# Patient Record
Sex: MALE | Race: WHITE
[De-identification: names, ages, dates, MRNs, and addresses within clinical notes are randomized per-mention and may not be internally consistent; named-entity substitution may affect disease eponyms.]

---

## 2017-05-23 LAB
ANION GAP SERPL CALC-SCNC: 12 MMOL/L (ref 5–19)
BUN SERPL-MCNC: 14 MG/DL (ref 7–20)
CALCIUM: 9 MG/DL (ref 8.4–10.2)
CHLORIDE SERPL-SCNC: 104 MMOL/L (ref 98–107)
CO2 SERPL-SCNC: 24 MMOL/L (ref 22–30)
CREAT SERPL-MCNC: 0.59 MG/DL (ref 0.52–1.25)
ERYTHROCYTE [DISTWIDTH] IN BLOOD BY AUTOMATED COUNT: 16 % (ref 11.5–14)
GLUCOSE SERPL-MCNC: 114 MG/DL (ref 75–110)
HCT VFR BLD CALC: 36.7 % (ref 37.9–51)
HGB BLD-MCNC: 12.1 G/DL (ref 13.5–17)
HGB HCT DIFFERENCE: -0.4
MCH RBC QN AUTO: 25.9 PG (ref 27–33.4)
MCHC RBC AUTO-ENTMCNC: 32.9 G/DL (ref 32–36)
MCV RBC AUTO: 79 FL (ref 80–97)
POTASSIUM SERPL-SCNC: 5.1 MMOL/L (ref 3.6–5)
RBC # BLD AUTO: 4.67 10^6/UL (ref 4.35–5.55)
SODIUM SERPL-SCNC: 140 MMOL/L (ref 137–145)
WBC # BLD AUTO: 4 10^3/UL (ref 4–10.5)

## 2017-05-30 ENCOUNTER — HOSPITAL ENCOUNTER (OUTPATIENT)
Dept: HOSPITAL 62 - OROUT | Age: 67
LOS: 3 days | Discharge: HOME | End: 2017-06-02
Attending: SURGERY
Payer: MEDICARE

## 2017-05-30 DIAGNOSIS — K70.31: ICD-10-CM

## 2017-05-30 DIAGNOSIS — Z80.0: ICD-10-CM

## 2017-05-30 DIAGNOSIS — R06.00: ICD-10-CM

## 2017-05-30 DIAGNOSIS — D61.818: ICD-10-CM

## 2017-05-30 DIAGNOSIS — I85.00: ICD-10-CM

## 2017-05-30 DIAGNOSIS — K42.9: ICD-10-CM

## 2017-05-30 DIAGNOSIS — D50.0: ICD-10-CM

## 2017-05-30 DIAGNOSIS — Z87.891: ICD-10-CM

## 2017-05-30 DIAGNOSIS — Z79.899: ICD-10-CM

## 2017-05-30 DIAGNOSIS — K76.6: ICD-10-CM

## 2017-05-30 DIAGNOSIS — C18.9: ICD-10-CM

## 2017-05-30 DIAGNOSIS — K31.7: Primary | ICD-10-CM

## 2017-05-30 DIAGNOSIS — R16.1: ICD-10-CM

## 2017-05-30 DIAGNOSIS — K64.8: ICD-10-CM

## 2017-05-30 DIAGNOSIS — N13.30: ICD-10-CM

## 2017-05-30 DIAGNOSIS — R74.0: ICD-10-CM

## 2017-05-30 DIAGNOSIS — K43.9: ICD-10-CM

## 2017-05-30 DIAGNOSIS — K21.9: ICD-10-CM

## 2017-05-30 LAB
ALBUMIN SERPL-MCNC: 3.2 G/DL (ref 3.5–5)
ALP SERPL-CCNC: 82 U/L (ref 38–126)
ALT SERPL-CCNC: 151 U/L (ref 21–72)
ANION GAP SERPL CALC-SCNC: 6 MMOL/L (ref 5–19)
ANION GAP SERPL CALC-SCNC: 9 MMOL/L (ref 5–19)
APTT BLD: 30.9 SEC (ref 23.5–35.8)
AST SERPL-CCNC: 163 U/L (ref 17–59)
BASOPHILS # BLD AUTO: 0 10^3/UL (ref 0–0.2)
BASOPHILS NFR BLD AUTO: 0.7 % (ref 0–2)
BILIRUB DIRECT SERPL-MCNC: 0.3 MG/DL (ref 0–0.4)
BILIRUB SERPL-MCNC: 0.9 MG/DL (ref 0.2–1.3)
BUN SERPL-MCNC: 13 MG/DL (ref 7–20)
BUN SERPL-MCNC: 13 MG/DL (ref 7–20)
CALCIUM: 8.2 MG/DL (ref 8.4–10.2)
CALCIUM: 8.5 MG/DL (ref 8.4–10.2)
CHLORIDE SERPL-SCNC: 106 MMOL/L (ref 98–107)
CHLORIDE SERPL-SCNC: 107 MMOL/L (ref 98–107)
CO2 SERPL-SCNC: 22 MMOL/L (ref 22–30)
CO2 SERPL-SCNC: 24 MMOL/L (ref 22–30)
CREAT SERPL-MCNC: 0.59 MG/DL (ref 0.52–1.25)
CREAT SERPL-MCNC: 0.6 MG/DL (ref 0.52–1.25)
EOSINOPHIL # BLD AUTO: 0.1 10^3/UL (ref 0–0.6)
EOSINOPHIL NFR BLD AUTO: 1.7 % (ref 0–6)
ERYTHROCYTE [DISTWIDTH] IN BLOOD BY AUTOMATED COUNT: 16.3 % (ref 11.5–14)
ERYTHROCYTE [DISTWIDTH] IN BLOOD BY AUTOMATED COUNT: 16.5 % (ref 11.5–14)
GLUCOSE SERPL-MCNC: 93 MG/DL (ref 75–110)
GLUCOSE SERPL-MCNC: 94 MG/DL (ref 75–110)
HCT VFR BLD CALC: 24.7 % (ref 37.9–51)
HCT VFR BLD CALC: 26.8 % (ref 37.9–51)
HGB BLD-MCNC: 8.2 G/DL (ref 13.5–17)
HGB BLD-MCNC: 8.8 G/DL (ref 13.5–17)
HGB HCT DIFFERENCE: -0.1
HGB HCT DIFFERENCE: -0.4
LYMPHOCYTES # BLD AUTO: 1.3 10^3/UL (ref 0.5–4.7)
LYMPHOCYTES NFR BLD AUTO: 25.7 % (ref 13–45)
MAGNESIUM SERPL-MCNC: 1.8 MG/DL (ref 1.6–2.3)
MCH RBC QN AUTO: 26.3 PG (ref 27–33.4)
MCH RBC QN AUTO: 26.7 PG (ref 27–33.4)
MCHC RBC AUTO-ENTMCNC: 32.8 G/DL (ref 32–36)
MCHC RBC AUTO-ENTMCNC: 33.4 G/DL (ref 32–36)
MCV RBC AUTO: 80 FL (ref 80–97)
MCV RBC AUTO: 80 FL (ref 80–97)
MONOCYTES # BLD AUTO: 0.5 10^3/UL (ref 0.1–1.4)
MONOCYTES NFR BLD AUTO: 10.8 % (ref 3–13)
NEUTROPHILS # BLD AUTO: 3.1 10^3/UL (ref 1.7–8.2)
NEUTS SEG NFR BLD AUTO: 61.1 % (ref 42–78)
PHOSPHATE SERPL-MCNC: 3.3 MG/DL (ref 2.5–4.5)
POTASSIUM SERPL-SCNC: 3.9 MMOL/L (ref 3.6–5)
POTASSIUM SERPL-SCNC: 4.1 MMOL/L (ref 3.6–5)
PROT SERPL-MCNC: 6.5 G/DL (ref 6.3–8.2)
PROTHROMBIN TIME: 14.4 SEC (ref 11.4–15.4)
RBC # BLD AUTO: 3.08 10^6/UL (ref 4.35–5.55)
RBC # BLD AUTO: 3.35 10^6/UL (ref 4.35–5.55)
SODIUM SERPL-SCNC: 137 MMOL/L (ref 137–145)
SODIUM SERPL-SCNC: 137.1 MMOL/L (ref 137–145)
WBC # BLD AUTO: 2.8 10^3/UL (ref 4–10.5)
WBC # BLD AUTO: 5 10^3/UL (ref 4–10.5)

## 2017-05-30 PROCEDURE — 87075 CULTR BACTERIA EXCEPT BLOOD: CPT

## 2017-05-30 PROCEDURE — 83615 LACTATE (LD) (LDH) ENZYME: CPT

## 2017-05-30 PROCEDURE — 83880 ASSAY OF NATRIURETIC PEPTIDE: CPT

## 2017-05-30 PROCEDURE — 85384 FIBRINOGEN ACTIVITY: CPT

## 2017-05-30 PROCEDURE — 93010 ELECTROCARDIOGRAM REPORT: CPT

## 2017-05-30 PROCEDURE — 82728 ASSAY OF FERRITIN: CPT

## 2017-05-30 PROCEDURE — 88342 IMHCHEM/IMCYTCHM 1ST ANTB: CPT

## 2017-05-30 PROCEDURE — 83540 ASSAY OF IRON: CPT

## 2017-05-30 PROCEDURE — 74177 CT ABD & PELVIS W/CONTRAST: CPT

## 2017-05-30 PROCEDURE — 85610 PROTHROMBIN TIME: CPT

## 2017-05-30 PROCEDURE — 80048 BASIC METABOLIC PNL TOTAL CA: CPT

## 2017-05-30 PROCEDURE — 0DB68ZX EXCISION OF STOMACH, VIA NATURAL OR ARTIFICIAL OPENING ENDOSCOPIC, DIAGNOSTIC: ICD-10-PCS | Performed by: SURGERY

## 2017-05-30 PROCEDURE — 82105 ALPHA-FETOPROTEIN SERUM: CPT

## 2017-05-30 PROCEDURE — 85027 COMPLETE CBC AUTOMATED: CPT

## 2017-05-30 PROCEDURE — 82378 CARCINOEMBRYONIC ANTIGEN: CPT

## 2017-05-30 PROCEDURE — 88313 SPECIAL STAINS GROUP 2: CPT

## 2017-05-30 PROCEDURE — 0W9G3ZZ DRAINAGE OF PERITONEAL CAVITY, PERCUTANEOUS APPROACH: ICD-10-PCS | Performed by: SURGERY

## 2017-05-30 PROCEDURE — 84157 ASSAY OF PROTEIN OTHER: CPT

## 2017-05-30 PROCEDURE — 43239 EGD BIOPSY SINGLE/MULTIPLE: CPT

## 2017-05-30 PROCEDURE — 87205 SMEAR GRAM STAIN: CPT

## 2017-05-30 PROCEDURE — 85025 COMPLETE CBC W/AUTO DIFF WBC: CPT

## 2017-05-30 PROCEDURE — 83735 ASSAY OF MAGNESIUM: CPT

## 2017-05-30 PROCEDURE — 87070 CULTURE OTHR SPECIMN AEROBIC: CPT

## 2017-05-30 PROCEDURE — 85730 THROMBOPLASTIN TIME PARTIAL: CPT

## 2017-05-30 PROCEDURE — 80053 COMPREHEN METABOLIC PANEL: CPT

## 2017-05-30 PROCEDURE — 86901 BLOOD TYPING SEROLOGIC RH(D): CPT

## 2017-05-30 PROCEDURE — 36415 COLL VENOUS BLD VENIPUNCTURE: CPT

## 2017-05-30 PROCEDURE — 88162 CYTOPATH SMEAR OTHER SOURCE: CPT

## 2017-05-30 PROCEDURE — 49083 ABD PARACENTESIS W/IMAGING: CPT

## 2017-05-30 PROCEDURE — 85045 AUTOMATED RETICULOCYTE COUNT: CPT

## 2017-05-30 PROCEDURE — 93005 ELECTROCARDIOGRAM TRACING: CPT

## 2017-05-30 PROCEDURE — 83550 IRON BINDING TEST: CPT

## 2017-05-30 PROCEDURE — 89050 BODY FLUID CELL COUNT: CPT

## 2017-05-30 PROCEDURE — 86900 BLOOD TYPING SEROLOGIC ABO: CPT

## 2017-05-30 PROCEDURE — 80074 ACUTE HEPATITIS PANEL: CPT

## 2017-05-30 PROCEDURE — 84100 ASSAY OF PHOSPHORUS: CPT

## 2017-05-30 PROCEDURE — 86701 HIV-1ANTIBODY: CPT

## 2017-05-30 PROCEDURE — 86850 RBC ANTIBODY SCREEN: CPT

## 2017-05-30 PROCEDURE — 84132 ASSAY OF SERUM POTASSIUM: CPT

## 2017-05-30 PROCEDURE — 88305 TISSUE EXAM BY PATHOLOGIST: CPT

## 2017-05-30 RX ADMIN — LANSOPRAZOLE SCH MG: 30 TABLET, ORALLY DISINTEGRATING, DELAYED RELEASE ORAL at 21:45

## 2017-05-30 RX ADMIN — SODIUM CHLORIDE PRN ML: 9 INJECTION, SOLUTION INTRAVENOUS at 19:43

## 2017-05-30 RX ADMIN — Medication SCH ML: at 21:12

## 2017-05-30 NOTE — RADIOLOGY REPORT (SQ)
EXAM DESCRIPTION:  CT ABD/PELVIS WITH IV   ORAL



COMPLETED DATE/TIME:  5/30/2017 8:13 pm



REASON FOR STUDY:  abdominal distension K43.9  VENTRAL HERNIA WITHOUT OBSTRUCTION OR GANGRENE K42.9  
UMBILICAL HERNIA WITHOUT OBSTRUCTION OR GANGRENE Z79.899  OTHER LONG TERM (CURRENT) DRUG THERAPY



COMPARISON:  None.



TECHNIQUE:  CT scan of the abdomen and pelvis performed with intravenous and oral contrast using vipul
destiny scanning technique with dynamic intravenous contrast injection. Images reviewed with lung, soft t
issue, and bone windows. Reconstructed coronal and sagittal MPR images reviewed. Delayed images for e
valuation of the urinary system also acquired. All images stored on PACS.

All CT scanners at this facility use dose modulation, iterative reconstruction, and/or weight based d
osing when appropriate to reduce radiation dose to as low as reasonably achievable (ALARA).

[facility]: Dose Right  [facility]: CareDose    [facility]: Dose Right    [facility]: Teradose 4D    [facility]: [employer]



CONTRAST TYPE AND DOSE:  88 mL Isovue 370



RENAL FUNCTION:  Creatinine 0.59



RADIATION DOSE:  32.02 mGy.



LIMITATIONS:  None.



FINDINGS:  LOWER CHEST: There is minimal airspace consolidation in the lung bases most consistent wit
h atelectatic changes.  Small hiatal hernia is identified.

LIVER: Normal size. No masses or dilated ducts.  Perihepatic ascitic fluid is identified.

SPLEEN: Normal size. No focal lesions.  Perisplenic ascitic fluid is identified.

PANCREAS: No masses. No significant calcifications. No adjacent inflammation or peripancreatic fluid 
collections. Pancreatic duct not dilated.

GALLBLADDER: No identified stones by CT criteria. No inflammatory changes to suggest cholecystitis.

ADRENAL GLANDS: No significant masses or asymmetry.

RIGHT KIDNEY AND URETER: No solid masses.   No significant calcifications.   No hydronephrosis or hyd
roureter.

LEFT KIDNEY AND URETER: No solid masses.   No significant calcifications.   Mild hydronephrotic figueroa
es are identified.

AORTA AND VESSELS: No aneurysm. No dissection. Renal arteries, SMA, celiac without stenosis.

RETROPERITONEUM: No retroperitoneal adenopathy, hemorrhage or masses.

BOWEL AND PERITONEAL CAVITY: No obstruction. No visualized masses. No inflammatory changes or thicken
ing of bowel wall.  Scattered pockets of ascitic fluid are identified throughout the abdomen and pelv
is.

APPENDIX: Not identified.

PELVIS: No significant masses.  Normal bladder.  Pelvic ascitic fluid is identified.

ABDOMINAL WALL: No masses.  Small umbilical hernia is identified containing bowel without obstruction
 and a small amount of ascitic fluid.

BONES: No significant or acute findings.

OTHER: No other significant finding.



IMPRESSION:  Intra-abdominal and pelvic ascitic fluid as noted above.  Small umbilical hernia is iden
tified containing a small amount of bowel without obstruction and ascitic fluid.  Mild hydronephrosis
 of the left kidney.  Other findings as noted above



TECHNICAL DOCUMENTATION:  JOB ID:  [id number]

Quality ID # [id number]: Final reports with documentation of one or more dose reduction techniques (e.g., Au
tomated exposure control, adjustment of the mA and/or kV according to patient size, use of iterative 
reconstruction technique)

 2011 [employer]- All Rights Reserved

## 2017-05-30 NOTE — PDOC H&P
History of Present Illness


Admission Date/PCP: 


  





  DARRIN TURPIN MD





Patient complains of: Abdominal distention


History of Present Illness: 


KEKE CHAMBERS III is a 67 year old male who was scheduled for laparoscopic 

umbilical and supraumbilical ventral hernia repair with mesh today for long-

standing hernias.  However he was noted with severe anemia of unclear etiology 

along with progressively worsening abdominal distention in the past several 

weeks.  Patient denies any change of his hernias with no pain and no nausea and 

vomiting.  He has been experiencing normal bowel movements with no signs or 

symptoms of gastrointestinal bleed other than isolated episode of dark stools 

last week.  He has a family history of colon cancer with his father developing 

colon cancer in his 50s.  Unfortunately the patient himself has never had a 

screening colonoscopy.  He suffers from frequent episodes of heartburn that is 

controlled with omeprazole.  He denies any dysphagia. he denies any chronic 

cough.  He denies any weight loss.  He denies any abdominal pain.  





Past Medical History


Cardiac Medical History: 


   Denies: Coronary Artery Disease, Myocardial Infarction, Hypertension


Pulmonary Medical History: 


   Denies: Asthma, Bronchitis, Chronic Obstructive Pulmonary Disease (COPD), 

Pneumonia


Neurological Medical History: 


   Denies: Seizures


GI Medical History: Reports: Gastroesophageal Reflux Disease


Musculoskeltal Medical History: 


   Denies: Arthritis


Hematology: 


   Denies: Anemia





Past Surgical History


Past Surgical History: Reports: Other - Arm orthopedic surgery in the remote 

past.





Social History


Smoking Status: Never Smoker


Drugs: None





Family History


Family History: Malignancy - Father with colon cancer in his 50s.


Parental Family History Reviewed: Yes


Children Family History Reviewed: No


Sibling(s) Family History Reviewed.: Yes





Medication/Allergy


Home Medications: 








Multivitamin [Multivitamins] 1 tab PO DAILY 05/23/17 


Omega-3/Dha/Epa/Fish Oil [Fish Oil 1,000 mg Softgel] 1 cap PO DAILY 05/23/17 


Omeprazole 1 cap PO DAILY 05/23/17 








Allergies/Adverse Reactions: 


 





No Known Allergies Allergy (Unverified 05/23/17 09:52)


 











Physical Exam


Vital Signs: 


 











Temp Pulse Resp BP Pulse Ox


 


 98.9 F   56 L  18   112/76   99 


 


 05/30/17 17:55  05/30/17 17:55  05/30/17 17:55  05/30/17 17:55  05/30/17 17:55








 Intake & Output











 05/29/17 05/30/17 05/31/17





 06:59 06:59 06:59


 


Intake Total   900


 


Balance   900


 


Weight   81 kg











General appearance: PRESENT: no acute distress, cooperative


Neck exam: PRESENT: other - No abnormal masses, no lymphadenopathy, nontender 

to palpation.


Respiratory exam: PRESENT: clear to auscultation lauren


Cardiovascular exam: PRESENT: RRR


GI/Abdominal exam: PRESENT: other - Distended but very soft and nontender to 

palpation with no palpable hepatosplenomegaly.  Moderate sized umbilical hernia 

that is easily reducible as well as supraumbilical hernia that is easily 

reducible.


Extremities exam: PRESENT: other - No swelling no tenderness.


Neurological exam: PRESENT: alert, altered, awake, oriented to person, oriented 

to place, oriented to time, oriented to situation


Psychiatric exam: PRESENT: appropriate affect


Skin exam: PRESENT: normal color





Results


Laboratory Results: 


 





 05/30/17 16:05 





 05/30/17 16:05 





 











  05/30/17 05/30/17 05/30/17





  13:09 13:09 14:52


 


WBC  5.0  


 


RBC  3.35 L  


 


Hgb  8.8 L  


 


Hct  26.8 L  


 


MCV  80  


 


MCH  26.3 L  


 


MCHC  32.8  


 


RDW  16.5 H  


 


Plt Count  110 L  


 


Seg Neutrophils %  61.1  


 


Lymphocytes %  25.7  


 


Monocytes %  10.8  


 


Eosinophils %  1.7  


 


Basophils %  0.7  


 


Absolute Neutrophils  3.1  


 


Absolute Lymphocytes  1.3  


 


Absolute Monocytes  0.5  


 


Absolute Eosinophils  0.1  


 


Absolute Basophils  0.0  


 


Sodium    137.0


 


Potassium   3.9  3.9


 


Chloride    106


 


Carbon Dioxide    22


 


Anion Gap    9


 


BUN    13


 


Creatinine    0.60


 


Est GFR ( Amer)    > 60


 


Est GFR (Non-Af Amer)    > 60


 


Glucose    94


 


Calcium    8.2 L


 


Phosphorus    3.3


 


Magnesium    1.8


 


Total Bilirubin    0.9


 


AST    163 H


 


ALT    151 H


 


Alkaline Phosphatase    82


 


Total Protein    6.5


 


Albumin    3.2 L


 


Blood Type   


 


Antibody Screen   














  05/30/17 05/30/17 05/30/17





  14:52 16:05 16:05


 


WBC   2.8 L D 


 


RBC   3.08 L 


 


Hgb   8.2 L 


 


Hct   24.7 L 


 


MCV   80 


 


MCH   26.7 L 


 


MCHC   33.4 


 


RDW   16.3 H 


 


Plt Count   82 L 


 


Seg Neutrophils %   


 


Lymphocytes %   


 


Monocytes %   


 


Eosinophils %   


 


Basophils %   


 


Absolute Neutrophils   


 


Absolute Lymphocytes   


 


Absolute Monocytes   


 


Absolute Eosinophils   


 


Absolute Basophils   


 


Sodium    137.1


 


Potassium    4.1


 


Chloride    107


 


Carbon Dioxide    24


 


Anion Gap    6


 


BUN    13


 


Creatinine    0.59


 


Est GFR ( Amer)    > 60


 


Est GFR (Non-Af Amer)    > 60


 


Glucose    93


 


Calcium    8.5


 


Phosphorus   


 


Magnesium   


 


Total Bilirubin   


 


AST   


 


ALT   


 


Alkaline Phosphatase   


 


Total Protein   


 


Albumin   


 


Blood Type  B POSITIVE  


 


Antibody Screen  NEGATIVE  











Impressions: 


 





Abdomen/Pelvis CT  05/30/17 00:00


IMPRESSION:  Intra-abdominal and pelvic ascitic fluid as noted above.  Small 

umbilical hernia is identified containing a small amount of bowel without 

obstruction and ascitic fluid.  Mild hydronephrosis of the left kidney.  Other 

findings as noted above


 














Assessment & Plan





- Diagnosis


(1) Pancytopenia


Is this a current diagnosis for this admission?: YesPlan: 


Of unclear etiology.  Along with marked abdominal distention without evidence 

of intestinal obstruction.  Will admit for evaluation. Highly suspicious for 

intra-abdominal malignancy.  CT scan demonstrates ascites but no focus of 

malignancy.  Will plan review of the CT scan with radiology in the morning.  

Will obtain hematology oncology consultation.  Will do a bowel prep tomorrow 

with plans for EGD and colonoscopy the following day.  His laparoscopic ventral 

hernia repair is canceled for now.  Pending malignancy workup.

## 2017-05-31 LAB
APPEARANCE FLD: CLEAR
BODY FLD TYPE: (no result)
CEA SERPL-MCNC: 3.9 NG/ML (ref ?–3)
FERRITIN SERPL-MCNC: 14.3 NG/ML (ref 17.9–464)
FLUID RBC AVERAGE: 56.5
FLUID RBC DILUENT USED: (no result)
FLUID RBC DILUTION FACTOR: 1
FLUID RBC SIDE 1: 57
FLUID RBC SIDE 2: 56
LDH1 SERPL-CCNC: 620 U/L (ref 313–618)
TOTAL RBC SQUARES COUNTED FLD: 225

## 2017-05-31 RX ADMIN — Medication SCH ML: at 05:06

## 2017-05-31 RX ADMIN — OMEGA-3-ACID ETHYL ESTERS SCH GM: 900 CAPSULE ORAL at 09:39

## 2017-05-31 RX ADMIN — LANSOPRAZOLE SCH MG: 30 TABLET, ORALLY DISINTEGRATING, DELAYED RELEASE ORAL at 18:10

## 2017-05-31 RX ADMIN — SODIUM CHLORIDE PRN ML: 9 INJECTION, SOLUTION INTRAVENOUS at 01:16

## 2017-05-31 RX ADMIN — Medication SCH ML: at 21:22

## 2017-05-31 RX ADMIN — Medication SCH ML: at 14:00

## 2017-05-31 RX ADMIN — LANSOPRAZOLE SCH MG: 30 TABLET, ORALLY DISINTEGRATING, DELAYED RELEASE ORAL at 05:31

## 2017-05-31 NOTE — PDOC CONSULTATION
Consultation


Consult Date: 05/31/17


Attending physician:: JEREMI GO


Consult reason:: Splenomegaly, ascitis





History of Present Illness


Admission Date/PCP: 


  





  DARRIN TURPIN MD





Patient complains of: Increasing abd girth, abd pain, weakness


History of Present Illness: 


67-year-old male who presented with a two-month history of increasing abdominal 

girth, he feels like he has had distention for several years, but over the last 

2 months much worse, because of the distention he was noticing a ventral hernia

, he was scheduled for an elective hernia repair but when they did the CBC, he 

was found to actually have pancytopenia with hemoglobin in the 8 range and 

platelets in the 60-70 range.  Previously the hemoglobin was 12 done just 1 

week ago the platelet count however 1 week ago was 75.  Prior to this he had 

not really seen any physicians.  He was admitted for further workup, CT of the 

abdomen pelvis indicated diffuse ascites and splenomegaly.  There were no 

active lesions noted on the imaging.  In further discussion with patient, he 

has a very strong alcohol history, until of about 5-10 years ago he had about a 

20-30 year history of drinking alcohol every day, he would drink anywhere from 7

-10 beers per day.  On the weekends he would drink a lot more than that.








Past Medical History


Cardiac Medical History: 


   Denies: Coronary Artery Disease, Myocardial Infarction, Hypertension


Pulmonary Medical History: 


   Denies: Asthma, Bronchitis, Chronic Obstructive Pulmonary Disease (COPD), 

Pneumonia


Neurological Medical History: 


   Denies: Seizures


GI Medical History: Reports: Gastroesophageal Reflux Disease


Musculoskeltal Medical History: 


   Denies: Arthritis


Hematology: 


   Denies: Anemia





Past Surgical History


Past Surgical History: Reports: Other - Arm orthopedic surgery in the remote 

past.





Social History


Smoking Status: Never Smoker


Frequency of Alcohol Use: Heavy - but quit, was drinking 7-10 beers/day


Drugs: None





Family History


Family History: Malignancy - Father with colon cancer in his 50s.


Parental Family History Reviewed: Yes


Children Family History Reviewed: Yes


Sibling(s) Family History Reviewed.: Yes





Medication/Allergy


Home Medications: 








Multivitamin [Multivitamins] 1 tab PO DAILY 05/23/17 


Omega-3/Dha/Epa/Fish Oil [Fish Oil 1,000 mg Softgel] 1 cap PO DAILY 05/23/17 


Omeprazole 1 cap PO DAILY 05/23/17 








Allergies/Adverse Reactions: 


 





No Known Allergies Allergy (Unverified 05/23/17 09:52)


 











Review of Systems


Constitutional: ABSENT: chills, fever(s), headache(s), weight gain, weight loss


Eyes: ABSENT: visual disturbances


Ears: ABSENT: hearing changes


Cardiovascular: ABSENT: chest pain, dyspnea on exertion, edema, orthropnea, 

palpitations


Respiratory: ABSENT: cough, hemoptysis


Gastrointestinal: ABSENT: abdominal pain, constipation, diarrhea, hematemesis, 

hematochezia, nausea, vomiting


Genitourinary: ABSENT: dysuria, hematuria


Musculoskeletal: ABSENT: joint swelling


Integumentary: ABSENT: rash, wounds


Neurological: ABSENT: abnormal gait, abnormal speech, confusion, dizziness, 

focal weakness, syncope


Psychiatric: ABSENT: anxiety, depression, homidical ideation, suicidal ideation


Endocrine: ABSENT: cold intolerance, heat intolerance, polydipsia, polyuria


Hematologic/Lymphatic: ABSENT: easy bleeding, easy bruising





Physical Exam


Vital Signs: 


 











Temp Pulse Resp BP Pulse Ox


 


 99.0 F   93   13   117/67   94 


 


 05/31/17 08:00  05/31/17 08:00  05/31/17 08:00  05/31/17 08:00  05/31/17 08:00








 Intake & Output











 05/30/17 05/31/17 06/01/17





 06:59 06:59 06:59


 


Intake Total  900 


 


Balance  900 


 


Weight  87.8 kg 














Results


Laboratory Results: 


 





 05/30/17 16:05 





 05/30/17 16:05 





 











  05/31/17 05/31/17





  09:27 09:27


 


Retic Count (auto)  3.13 H 


 


Absolute Retic  0.100 


 


Iron   11.5 L


 


TIBC   441


 


% Saturation   3


 


Ferritin   14.30 L











Impressions: 


 





Abdomen/Pelvis CT  05/30/17 00:00


IMPRESSION:  Intra-abdominal and pelvic ascitic fluid as noted above.  Small 

umbilical hernia is identified containing a small amount of bowel without 

obstruction and ascitic fluid.  Mild hydronephrosis of the left kidney.  Other 

findings as noted above


 














Assessment & Plan





- Diagnosis


(1) Pancytopenia


Is this a current diagnosis for this admission?: YesPlan: 


Today we did a full workup, I did iron studies, B12 level, I also ordered 

ultrasound-guided paracentesis, this should all help delineate whether or not 

he has evidence consistent with cirrhosis.  This would be the most likely cause 

of his pancytopenia.  We will see the results of this and then follow-up











- Time


Time Spent: Greater than 70 Minutes


Critical Time spent with patient: 35 or more minutes

## 2017-05-31 NOTE — RADIOLOGY REPORT (SQ)
EXAM DESCRIPTION:  U/S ABD PARACENTESIS



COMPLETED DATE/TIME:  5/31/2017 4:21 pm



REASON FOR STUDY:  ASCITES



COMPARISON  CT abdomen pelvis 5/30/2017



LIMITATIONS:  None.



PROCEDURE:  After obtaining informed consent, the patient was brought to the ultrasound suite.  The p
rocedure was performed with the patient on a gurney.  Ultrasound was used to identify a prominent poc
ket of ascites in the left lower quadrant.  An appropriate access site was selected.  The patient was
 prepped and draped in usual sterile fashion.   The access site was anesthetized with 4.5 mL 1% lidoc
lisa.  A 5 Slov[place] Accustick needle was advanced into the fluid under direct ultrasound visualization.
  After aspiration of fluid the needle, the catheter was advanced off the needle into the fluid.  A t
otal of 500 mL of clear yellow fluid was removed. The patient tolerated the procedure well left the d
epartment in satisfactory condition.

Fluid was sent for testing as per Dr. Tolentino



IMPRESSION:  Successful ultrasound-guided diagnostic and therapeutic paracentesis



COMMENT:  Patient medication list reviewed: Yes- Quality ID# 130:Eligible professional attests to doc
umenting in the medical record they obtained, updated, or reviewed the patient's current medications.


Quality ID #76: The patient was prepped and draped using maximum sterile barrier technique including 
cap, mask, sterile gown, sterile gloves, a large sterile sheet, hand hygiene, and 2% Chlorhexidine fo
r cutaneous antisepsis. When ultrasound is used, sterile ultrasound techniques are followed requiring
 sterile gel and sterile probes.

Quality ID #145: Final reports for procedures using fluoroscopy that document radiation exposure rachael
la, or exposure time and number of fluorographic images (if radiation exposure indices are not avail
able)



TECHNICAL DOCUMENTATION:  JOB ID:  [id number]

 2011 [employer]- All Rights Reserved

## 2017-05-31 NOTE — PDOC PROGRESS REPORT
Subjective


Progress Note for:: 05/31/17


Subjective:: 


Feels well.  No complaints.  No shortness of breath.  Has been having normal 

bowel movement with no blood per rectum no dark stools.





Physical Exam


Vital Signs: 


 











Temp Pulse Resp BP Pulse Ox


 


 99.0 F   93   13   117/67   94 


 


 05/31/17 08:00  05/31/17 08:00  05/31/17 08:00  05/31/17 08:00  05/31/17 08:00








 Intake & Output











 05/30/17 05/31/17 06/01/17





 06:59 06:59 06:59


 


Intake Total  900 


 


Balance  900 


 


Weight  87.8 kg 











General appearance: PRESENT: no acute distress, cooperative


Respiratory exam: PRESENT: clear to auscultation lauren


Cardiovascular exam: PRESENT: RRR


GI/Abdominal exam: PRESENT: other - Soft, distended.  Nontender to palpation.  

Ventral hernias are easily reducible.





Results


Laboratory Results: 


 





 05/30/17 16:05 





 05/30/17 16:05 





 











  05/30/17 05/30/17 05/30/17





  13:09 13:09 14:52


 


WBC  5.0  


 


RBC  3.35 L  


 


Hgb  8.8 L  


 


Hct  26.8 L  


 


MCV  80  


 


MCH  26.3 L  


 


MCHC  32.8  


 


RDW  16.5 H  


 


Plt Count  110 L  


 


Seg Neutrophils %  61.1  


 


Lymphocytes %  25.7  


 


Monocytes %  10.8  


 


Eosinophils %  1.7  


 


Basophils %  0.7  


 


Absolute Neutrophils  3.1  


 


Absolute Lymphocytes  1.3  


 


Absolute Monocytes  0.5  


 


Absolute Eosinophils  0.1  


 


Absolute Basophils  0.0  


 


Retic Count (auto)   


 


Absolute Retic   


 


Sodium    137.0


 


Potassium   3.9  3.9


 


Chloride    106


 


Carbon Dioxide    22


 


Anion Gap    9


 


BUN    13


 


Creatinine    0.60


 


Est GFR ( Amer)    > 60


 


Est GFR (Non-Af Amer)    > 60


 


Glucose    94


 


Calcium    8.2 L


 


Phosphorus    3.3


 


Magnesium    1.8


 


Total Bilirubin    0.9


 


AST    163 H


 


ALT    151 H


 


Alkaline Phosphatase    82


 


Total Protein    6.5


 


Albumin    3.2 L


 


Blood Type   


 


Antibody Screen   














  05/30/17 05/30/17 05/30/17





  14:52 16:05 16:05


 


WBC   2.8 L D 


 


RBC   3.08 L 


 


Hgb   8.2 L 


 


Hct   24.7 L 


 


MCV   80 


 


MCH   26.7 L 


 


MCHC   33.4 


 


RDW   16.3 H 


 


Plt Count   82 L 


 


Seg Neutrophils %   


 


Lymphocytes %   


 


Monocytes %   


 


Eosinophils %   


 


Basophils %   


 


Absolute Neutrophils   


 


Absolute Lymphocytes   


 


Absolute Monocytes   


 


Absolute Eosinophils   


 


Absolute Basophils   


 


Retic Count (auto)   


 


Absolute Retic   


 


Sodium    137.1


 


Potassium    4.1


 


Chloride    107


 


Carbon Dioxide    24


 


Anion Gap    6


 


BUN    13


 


Creatinine    0.59


 


Est GFR ( Amer)    > 60


 


Est GFR (Non-Af Amer)    > 60


 


Glucose    93


 


Calcium    8.5


 


Phosphorus   


 


Magnesium   


 


Total Bilirubin   


 


AST   


 


ALT   


 


Alkaline Phosphatase   


 


Total Protein   


 


Albumin   


 


Blood Type  B POSITIVE  


 


Antibody Screen  NEGATIVE  














  05/31/17





  09:27


 


WBC 


 


RBC 


 


Hgb 


 


Hct 


 


MCV 


 


MCH 


 


MCHC 


 


RDW 


 


Plt Count 


 


Seg Neutrophils % 


 


Lymphocytes % 


 


Monocytes % 


 


Eosinophils % 


 


Basophils % 


 


Absolute Neutrophils 


 


Absolute Lymphocytes 


 


Absolute Monocytes 


 


Absolute Eosinophils 


 


Absolute Basophils 


 


Retic Count (auto)  3.13 H


 


Absolute Retic  0.100


 


Sodium 


 


Potassium 


 


Chloride 


 


Carbon Dioxide 


 


Anion Gap 


 


BUN 


 


Creatinine 


 


Est GFR ( Amer) 


 


Est GFR (Non-Af Amer) 


 


Glucose 


 


Calcium 


 


Phosphorus 


 


Magnesium 


 


Total Bilirubin 


 


AST 


 


ALT 


 


Alkaline Phosphatase 


 


Total Protein 


 


Albumin 


 


Blood Type 


 


Antibody Screen 











Impressions: 


 





Abdomen/Pelvis CT  05/30/17 00:00


IMPRESSION:  Intra-abdominal and pelvic ascitic fluid as noted above.  Small 

umbilical hernia is identified containing a small amount of bowel without 

obstruction and ascitic fluid.  Mild hydronephrosis of the left kidney.  Other 

findings as noted above


 














Assessment & Plan





- Diagnosis


(1) Pancytopenia


Is this a current diagnosis for this admission?: YesPlan: 


Of unclear etiology.  Cirrhosis is in the differential diagnosis.  Greatly 

appreciate hematology input.  No evidence of ongoing blood loss.  Pending 

paracentesis today.  We will do bowel prep with plans for EGD and colonoscopy 

tomorrow.  Patient understands risks and benefits of the procedure including 

risk of bleeding and intestinal injury.

## 2017-06-01 LAB
BASOPHILS # BLD AUTO: 0 10^3/UL (ref 0–0.2)
BASOPHILS NFR BLD AUTO: 0.9 % (ref 0–2)
EOSINOPHIL # BLD AUTO: 0.1 10^3/UL (ref 0–0.6)
EOSINOPHIL NFR BLD AUTO: 3.4 % (ref 0–6)
ERYTHROCYTE [DISTWIDTH] IN BLOOD BY AUTOMATED COUNT: 16.6 % (ref 11.5–14)
HCT VFR BLD CALC: 24.2 % (ref 37.9–51)
HGB BLD-MCNC: 8 G/DL (ref 13.5–17)
HGB HCT DIFFERENCE: -0.2
LYMPHOCYTES # BLD AUTO: 0.9 10^3/UL (ref 0.5–4.7)
LYMPHOCYTES NFR BLD AUTO: 31.5 % (ref 13–45)
MCH RBC QN AUTO: 26.4 PG (ref 27–33.4)
MCHC RBC AUTO-ENTMCNC: 33.1 G/DL (ref 32–36)
MCV RBC AUTO: 80 FL (ref 80–97)
MONOCYTES # BLD AUTO: 0.3 10^3/UL (ref 0.1–1.4)
MONOCYTES NFR BLD AUTO: 9.6 % (ref 3–13)
NEUTROPHILS # BLD AUTO: 1.6 10^3/UL (ref 1.7–8.2)
NEUTS SEG NFR BLD AUTO: 54.6 % (ref 42–78)
RBC # BLD AUTO: 3.03 10^6/UL (ref 4.35–5.55)
WBC # BLD AUTO: 2.9 10^3/UL (ref 4–10.5)

## 2017-06-01 RX ADMIN — PROPRANOLOL HYDROCHLORIDE SCH MG: 10 TABLET ORAL at 22:18

## 2017-06-01 RX ADMIN — LANSOPRAZOLE SCH MG: 30 TABLET, ORALLY DISINTEGRATING, DELAYED RELEASE ORAL at 18:10

## 2017-06-01 RX ADMIN — OMEGA-3-ACID ETHYL ESTERS SCH GM: 900 CAPSULE ORAL at 12:24

## 2017-06-01 RX ADMIN — Medication SCH ML: at 05:18

## 2017-06-01 RX ADMIN — Medication SCH ML: at 22:16

## 2017-06-01 RX ADMIN — MIDAZOLAM HYDROCHLORIDE ONE MG: 1 INJECTION, SOLUTION INTRAMUSCULAR; INTRAVENOUS at 10:27

## 2017-06-01 RX ADMIN — MIDAZOLAM HYDROCHLORIDE ONE MG: 1 INJECTION, SOLUTION INTRAMUSCULAR; INTRAVENOUS at 10:48

## 2017-06-01 RX ADMIN — LANSOPRAZOLE SCH MG: 30 TABLET, ORALLY DISINTEGRATING, DELAYED RELEASE ORAL at 05:21

## 2017-06-01 RX ADMIN — MIDAZOLAM HYDROCHLORIDE ONE MG: 1 INJECTION, SOLUTION INTRAMUSCULAR; INTRAVENOUS at 10:32

## 2017-06-01 RX ADMIN — Medication SCH ML: at 15:09

## 2017-06-01 RX ADMIN — MIDAZOLAM HYDROCHLORIDE ONE MG: 1 INJECTION, SOLUTION INTRAMUSCULAR; INTRAVENOUS at 10:43

## 2017-06-01 NOTE — OPERATIVE REPORT
Operative Report


DATE OF SURGERY: 06/01/17


PREOPERATIVE DIAGNOSIS: Blood loss associated anemia, gastroesophageal reflux 

disease, family history of colon cancer.


POSTOPERATIVE DIAGNOSIS: Esophageal varices.  Fundic gland polyps.  Internal 

hemorrhoids.


OPERATION: Esophagogastroduodenoscopy with snare polypectomy of gastric polyp.  

Colonoscopy.


ANESTHESIA: Moderate Sedation


TISSUE REMOVED OR ALTERED: Fundic gland polyp.


COMPLICATIONS: 


None


ESTIMATED BLOOD LOSS: Minimal


INTRAOPERATIVE FINDINGS: Very prominent mid to distal esophageal varices.  

Fundic gland polyps.  No evidence of peptic ulcer disease.  Markedly redundant 

colon.  Internal hemorrhoids.


PROCEDURE: 


Informed consent was obtained.  Patient was brought to the endoscopy suite.  IV 

sedation with Versed and fentanyl was administered.  Endoscope was passed via 

the patient's mouth into the second portion of the duodenum.  The duodenum 

appeared to be normal.  The stomach had scattered fundic gland polyps.  1 of 

the more prominent polyp was snare polypectomy.  The specimen was retrieved.  

Hemostasis appeared to be good.  No ulcers and no suspicious masses were seen 

other than the fundic gland polyps.  Patient had a small hiatal hernia.  There 

were very prominent esophageal varices from the mid to distal esophagus.  No 

stigmata recent bleed however.





Patient was repositioned.  Digital rectal exam revealed no palpable perianal 

masses.  Endoscope was passed via the patient's anus it was prepped to the 

cecum.  Patient's colon was markedly redundant making the procedure very 

difficult.  However adequate visualization was obtained.  The cecum, right colon

, transverse colon, descending colon, sigmoid colon, and the rectum were all 

unremarkable with no masses no diverticuli.  There were prominent internal 

hemorrhoids but no stigmata of recent bleed.  Patient tolerated procedure well 

with no apparent complications and was taken to the recovery in stable 

condition.

## 2017-06-01 NOTE — PDOC PROGRESS REPORT
Subjective


Progress Note for:: 06/01/17


Subjective:: 


Pt doing well this am, prepped for scopes yesterday, had paracentesis w/ 500cc 

out. 





Physical Exam


Vital Signs: 


 











Temp Pulse Resp BP Pulse Ox


 


 99.4 F   83   19   108/66   95 


 


 06/01/17 07:32  06/01/17 07:32  06/01/17 07:32  06/01/17 07:32  06/01/17 07:32








 Intake & Output











 05/31/17 06/01/17 06/02/17





 06:59 06:59 06:59


 


Intake Total 900 900 


 


Balance 900 900 


 


Weight 87.8 kg  











General appearance: PRESENT: no acute distress, well-developed, well-nourished


Head exam: PRESENT: atraumatic, normocephalic


Eye exam: PRESENT: conjunctiva pink, EOMI, PERRLA.  ABSENT: scleral icterus


Ear exam: PRESENT: normal external ear exam


Mouth exam: PRESENT: moist, tongue midline


Neck exam: ABSENT: carotid bruit, JVD, lymphadenopathy, thyromegaly


Respiratory exam: PRESENT: clear to auscultation lauren.  ABSENT: rales, rhonchi, 

wheezes


Cardiovascular exam: PRESENT: RRR.  ABSENT: diastolic murmur, rubs, systolic 

murmur


Pulses: PRESENT: normal dorsalis pedis pul


Vascular exam: PRESENT: normal capillary refill


GI/Abdominal exam: PRESENT: normal bowel sounds, soft.  ABSENT: distended, 

guarding, mass, organolmegaly, rebound, tenderness


Rectal exam: PRESENT: deferred


Extremities exam: PRESENT: full ROM.  ABSENT: calf tenderness, clubbing, pedal 

edema


Neurological exam: PRESENT: alert, awake, oriented to person, oriented to place

, oriented to time, oriented to situation, CN II-XII grossly intact.  ABSENT: 

motor sensory deficit


Psychiatric exam: PRESENT: appropriate affect, normal mood.  ABSENT: homicidal 

ideation, suicidal ideation


Skin exam: PRESENT: dry, intact, warm.  ABSENT: cyanosis, rash





Results


Laboratory Results: 


 





 05/30/17 16:05 





 05/30/17 16:05 





 











  05/31/17 05/31/17 05/31/17





  09:27 09:27 15:37


 


Retic Count (auto)  3.13 H  


 


Absolute Retic  0.100  


 


Iron   11.5 L 


 


TIBC   441 


 


% Saturation   3 


 


Ferritin   14.30 L 


 


Fluid Type    PERITONEAL


 


Fluid Source    PARACENTESIS


 


Fluid Color    STRAW


 


Fluid Appearance    CLEAR


 


Fluid Viscosity    LIQUID


 


Fluid WBC    97


 


Fluid RBC    62











Impressions: 


 





Abdomen/Pelvis CT  05/30/17 00:00


IMPRESSION:  Intra-abdominal and pelvic ascitic fluid as noted above.  Small 

umbilical hernia is identified containing a small amount of bowel without 

obstruction and ascitic fluid.  Mild hydronephrosis of the left kidney.  Other 

findings as noted above


 








Paracentesis Ultrasound  05/31/17 08:33


IMPRESSION:  Successful ultrasound-guided diagnostic and therapeutic 

paracentesis


 














Assessment & Plan





- Diagnosis


(1) Pancytopenia


Is this a current diagnosis for this admission?: YesPlan: 


Reviewed initial results of paracentesis, cell ct w/ some monocytes, cytology 

planned to evaluate, f/u results of scopes. But still most likely cause seems 

cirrhosis, hb is 8 range likely c/w iron def, plan IV iron today. Today spent 

30 min in discussion w/ pt about w/u in process.











- Time


Time Spent with patient: 25-34 minutes


Critical Time spent with patient: 25-34 minutes

## 2017-06-02 VITALS — SYSTOLIC BLOOD PRESSURE: 121 MMHG | DIASTOLIC BLOOD PRESSURE: 71 MMHG

## 2017-06-02 LAB
ADD HIVPANEL?: NO
BASOPHILS # BLD AUTO: 0 10^3/UL (ref 0–0.2)
BASOPHILS NFR BLD AUTO: 0.8 % (ref 0–2)
EOSINOPHIL # BLD AUTO: 0.1 10^3/UL (ref 0–0.6)
EOSINOPHIL NFR BLD AUTO: 4 % (ref 0–6)
ERYTHROCYTE [DISTWIDTH] IN BLOOD BY AUTOMATED COUNT: 17.1 % (ref 11.5–14)
HCT VFR BLD CALC: 24.2 % (ref 37.9–51)
HGB BLD-MCNC: 8 G/DL (ref 13.5–17)
HGB HCT DIFFERENCE: -0.2
HIV (1 AND 2) ANTIBODY: NEGATIVE
LYMPHOCYTES # BLD AUTO: 1.1 10^3/UL (ref 0.5–4.7)
LYMPHOCYTES NFR BLD AUTO: 36.1 % (ref 13–45)
MCH RBC QN AUTO: 26.4 PG (ref 27–33.4)
MCHC RBC AUTO-ENTMCNC: 33.1 G/DL (ref 32–36)
MCV RBC AUTO: 80 FL (ref 80–97)
MONOCYTES # BLD AUTO: 0.3 10^3/UL (ref 0.1–1.4)
MONOCYTES NFR BLD AUTO: 10 % (ref 3–13)
NEUTROPHILS # BLD AUTO: 1.5 10^3/UL (ref 1.7–8.2)
NEUTS SEG NFR BLD AUTO: 49.1 % (ref 42–78)
RBC # BLD AUTO: 3.03 10^6/UL (ref 4.35–5.55)
WBC # BLD AUTO: 3.1 10^3/UL (ref 4–10.5)

## 2017-06-02 RX ADMIN — OMEGA-3-ACID ETHYL ESTERS SCH GM: 900 CAPSULE ORAL at 09:33

## 2017-06-02 RX ADMIN — PROPRANOLOL HYDROCHLORIDE SCH MG: 10 TABLET ORAL at 09:33

## 2017-06-02 RX ADMIN — Medication SCH ML: at 05:38

## 2017-06-02 RX ADMIN — LANSOPRAZOLE SCH MG: 30 TABLET, ORALLY DISINTEGRATING, DELAYED RELEASE ORAL at 05:38

## 2017-06-02 RX ADMIN — Medication SCH ML: at 14:33

## 2017-06-02 NOTE — PDOC PROGRESS REPORT
Subjective


Progress Note for:: 06/02/17


Subjective:: 


Patient doing better today





Physical Exam


Vital Signs: 


 











Temp Pulse Resp BP Pulse Ox


 


 98.1 F   82   16   108/59 L  93 


 


 06/02/17 08:09  06/02/17 08:09  06/02/17 08:09  06/02/17 08:09  06/02/17 08:09








 Intake & Output











 06/01/17 06/02/17 06/03/17





 06:59 06:59 06:59


 


Intake Total 900 2187 


 


Balance 900 2187 


 


Weight  88.2 kg 











General appearance: PRESENT: no acute distress, well-developed, well-nourished


Head exam: PRESENT: atraumatic, normocephalic


Eye exam: PRESENT: conjunctiva pink, EOMI, PERRLA.  ABSENT: scleral icterus


Ear exam: PRESENT: normal external ear exam


Mouth exam: PRESENT: moist, tongue midline


Neck exam: ABSENT: carotid bruit, JVD, lymphadenopathy, thyromegaly


Respiratory exam: PRESENT: clear to auscultation lauren.  ABSENT: rales, rhonchi, 

wheezes


Cardiovascular exam: PRESENT: RRR.  ABSENT: diastolic murmur, rubs, systolic 

murmur


Pulses: PRESENT: normal dorsalis pedis pul


Vascular exam: PRESENT: normal capillary refill


GI/Abdominal exam: PRESENT: normal bowel sounds, soft.  ABSENT: distended, 

guarding, mass, organolmegaly, rebound, tenderness


Rectal exam: PRESENT: deferred


Extremities exam: PRESENT: full ROM.  ABSENT: calf tenderness, clubbing, pedal 

edema


Neurological exam: PRESENT: alert, awake, oriented to person, oriented to place

, oriented to time, oriented to situation, CN II-XII grossly intact.  ABSENT: 

motor sensory deficit


Psychiatric exam: PRESENT: appropriate affect, normal mood.  ABSENT: homicidal 

ideation, suicidal ideation


Skin exam: PRESENT: dry, intact, warm.  ABSENT: cyanosis, rash





Results


Laboratory Results: 


 





 06/02/17 06:07 





 05/30/17 16:05 





 











  06/01/17 06/02/17





  08:23 06:07


 


WBC  2.9 L  3.1 L


 


RBC  3.03 L  3.03 L


 


Hgb  8.0 L  8.0 L


 


Hct  24.2 L  24.2 L


 


MCV  80  80


 


MCH  26.4 L  26.4 L


 


MCHC  33.1  33.1


 


RDW  16.6 H  17.1 H


 


Plt Count  95 L  99 L


 


Seg Neutrophils %  54.6  49.1


 


Lymphocytes %  31.5  36.1


 


Monocytes %  9.6  10.0


 


Eosinophils %  3.4  4.0


 


Basophils %  0.9  0.8


 


Absolute Neutrophils  1.6 L  1.5 L


 


Absolute Lymphocytes  0.9  1.1


 


Absolute Monocytes  0.3  0.3


 


Absolute Eosinophils  0.1  0.1


 


Absolute Basophils  0.0  0.0











Impressions: 


 





Abdomen/Pelvis CT  05/30/17 00:00


IMPRESSION:  Intra-abdominal and pelvic ascitic fluid as noted above.  Small 

umbilical hernia is identified containing a small amount of bowel without 

obstruction and ascitic fluid.  Mild hydronephrosis of the left kidney.  Other 

findings as noted above


 








Paracentesis Ultrasound  05/31/17 08:33


IMPRESSION:  Successful ultrasound-guided diagnostic and therapeutic 

paracentesis


 














Assessment & Plan





- Diagnosis


(1) Pancytopenia


Is this a current diagnosis for this admission?: YesPlan: 


Now we have final diagnosis, EGD indicated varices consistent with cirrhosis as 

we suspected, this is the cause of the pancytopenia, no further workup needed, 

he will need further IV iron as an outpatient, we will arrange this, he will 

see us in a few weeks time to go over this, we have asked for GI follow-up as 

well with Dr. Llamas.  Discharge home today most likely.

## 2017-06-02 NOTE — PDOC PROGRESS REPORT
Subjective


Progress Note for:: 06/02/17


Subjective:: 


feels well. no complaints, no s/s/ of gi bleed. did not take beta blocker 

yesterday because he didnt know what it was although I cleared told him and his 

wife yesterday. He is agreeable now. No GI available. will consult hospitalist 

about starting diuretic therapy, start iron and colace. 





Physical Exam


Vital Signs: 


 











Temp Pulse Resp BP Pulse Ox


 


 98.1 F   82   16   108/59 L  93 


 


 06/02/17 08:09  06/02/17 08:09  06/02/17 08:09  06/02/17 08:09  06/02/17 08:09








 Intake & Output











 06/01/17 06/02/17 06/03/17





 06:59 06:59 06:59


 


Intake Total 900 2187 


 


Balance 900 2187 


 


Weight  88.2 kg 














Results


Laboratory Results: 


 





 06/02/17 06:07 





 05/30/17 16:05 





 











  06/01/17 06/02/17





  08:23 06:07


 


WBC  2.9 L  3.1 L


 


RBC  3.03 L  3.03 L


 


Hgb  8.0 L  8.0 L


 


Hct  24.2 L  24.2 L


 


MCV  80  80


 


MCH  26.4 L  26.4 L


 


MCHC  33.1  33.1


 


RDW  16.6 H  17.1 H


 


Plt Count  95 L  99 L


 


Seg Neutrophils %  54.6  49.1


 


Lymphocytes %  31.5  36.1


 


Monocytes %  9.6  10.0


 


Eosinophils %  3.4  4.0


 


Basophils %  0.9  0.8


 


Absolute Neutrophils  1.6 L  1.5 L


 


Absolute Lymphocytes  0.9  1.1


 


Absolute Monocytes  0.3  0.3


 


Absolute Eosinophils  0.1  0.1


 


Absolute Basophils  0.0  0.0











Impressions: 


 





Abdomen/Pelvis CT  05/30/17 00:00


IMPRESSION:  Intra-abdominal and pelvic ascitic fluid as noted above.  Small 

umbilical hernia is identified containing a small amount of bowel without 

obstruction and ascitic fluid.  Mild hydronephrosis of the left kidney.  Other 

findings as noted above


 








Paracentesis Ultrasound  05/31/17 08:33


IMPRESSION:  Successful ultrasound-guided diagnostic and therapeutic 

paracentesis


 














Assessment & Plan





- Diagnosis


(1) Pancytopenia


Is this a current diagnosis for this admission?: Yes

## 2017-06-02 NOTE — PDOC PROGRESS REPORT
Subjective


Progress Note for:: 06/02/17


Subjective:: 


doing well. no light headedness. no complaints





Physical Exam


Vital Signs: 


 











Temp Pulse Resp BP Pulse Ox


 


 98.3 F   72   16   136/74 H  94 


 


 06/02/17 11:30  06/02/17 11:30  06/02/17 11:30  06/02/17 11:30  06/02/17 11:30








 Intake & Output











 06/01/17 06/02/17 06/03/17





 06:59 06:59 06:59


 


Intake Total 900 2187 


 


Balance 900 2187 


 


Weight  88.2 kg 











General appearance: PRESENT: no acute distress, cooperative


Respiratory exam: PRESENT: clear to auscultation lauren


Cardiovascular exam: PRESENT: RRR


GI/Abdominal exam: PRESENT: other - soft, distended, easily reducible ventral 

hernias.





Results


Laboratory Results: 


 





 06/02/17 06:07 





 05/30/17 16:05 





 











  05/31/17 06/02/17





  15:37 06:07


 


WBC   3.1 L


 


RBC   3.03 L


 


Hgb   8.0 L


 


Hct   24.2 L


 


MCV   80


 


MCH   26.4 L


 


MCHC   33.1


 


RDW   17.1 H


 


Plt Count   99 L


 


Seg Neutrophils %   49.1


 


Lymphocytes %   36.1


 


Monocytes %   10.0


 


Eosinophils %   4.0


 


Basophils %   0.8


 


Absolute Neutrophils   1.5 L


 


Absolute Lymphocytes   1.1


 


Absolute Monocytes   0.3


 


Absolute Eosinophils   0.1


 


Absolute Basophils   0.0


 


Fluid Total Protein  0.5 


 


Fluid LDH  38 








 











  06/02/17





  06:07


 


NT-Pro-B Natriuret Pep  291











Impressions: 


 





Abdomen/Pelvis CT  05/30/17 00:00


IMPRESSION:  Intra-abdominal and pelvic ascitic fluid as noted above.  Small 

umbilical hernia is identified containing a small amount of bowel without 

obstruction and ascitic fluid.  Mild hydronephrosis of the left kidney.  Other 

findings as noted above


 








Paracentesis Ultrasound  05/31/17 08:33


IMPRESSION:  Successful ultrasound-guided diagnostic and therapeutic 

paracentesis


 














Assessment & Plan





- Diagnosis


(1) Pancytopenia


Is this a current diagnosis for this admission?: Yes





(2) Cirrhosis of liver with ascites





Is this a current diagnosis for this admission?: YesPlan: 


tolerated propranolol. appreciated hospitalist input. d/c home. will set up 

outpt appointment with GI.

## 2017-06-02 NOTE — DISCHARGE SUMMARY E
Discharge Summary



NAME: KEKE CHAMBERS

MRN:  [id number]        : 1950     AGE: 67Y

ADMITTED: 2017                  DISCHARGED: 2017



DISCHARGE DIAGNOSIS:

Hepatocellular cirrhosis with portal hypertension with ascites and

esophageal varices with evidence of recent gastrointestinal bleed.



SECONDARY DIAGNOSIS:

Internal hemorrhoids.



PROCEDURE PERFORMED DURING HOSPITALIZATION:

Esophagogastroduodenoscopy with snare polypectomy of fundic gland polyp

and colonoscopy performed by Dr. José Go on [place] the 1st, 2017.



HOSPITAL COURSE:

The patient was noted with a hemoglobin of 8.2 and hematocrit of 24.7 that

remained stable through his hospital stay with no further evidence of

gastrointestinal bleed.  He underwent an EGD and colonoscopy.  EGD was

noteworthy for very prominent esophageal varices but no active bleeding. 

He was also noted with incidental fundic gland polyp which was snare

polypectomied and biopsy pathology proven.  Colonoscopy demonstrated

internal hemorrhoids but no evidence of colon malignancy. 

Gastroenterology consultation was not available at our hospital at this

time.  Oncology as well as Hospitalist consultations were obtained.  The

patient was begun on propranolol which he tolerated well.  He was feeling

well at the time of discharge with no evidence of presyncope and no

evidence of GI bleed.  The patient will be managed as an outpatient on a

low-sodium/2-liter fluid restriction diet.  Hospitalist was reluctant to

place him on diuretics at this time.  They thought that he was relatively

fluid restricted.  Hepatitis panel as well as HIV testing was sent out,

but the results of the hepatitis panel is still pending.  The HIV was

negative.



CONDITION ON DISCHARGE:

The patient is now being discharged to home in fair condition.



DISCHARGE INSTRUCTIONS:

He will follow up with me in a couple of weeks.  We will arrange an

outpatient evaluation with Gastroenterology.  He is to avoid alcohol and

NSAIDs.



DISCHARGE MEDICATIONS:

1.  Inderal 10 mg 1 p.o. b.i.d.

2.  Colace 100 mg 1 p.o. b.i.d.

3.  Feosol 325 mg with each meal.

The patient is to resume his:

4.  Fish oil.

5.  Omeprazole.





DICTATING PHYSICIAN:  JOSÉ GO M.D.





[id number]                  DT: 2017    [id number]

PHY#: [id number]            DD: 2017    1620

ID:   [id number]           JOB#: [id number]       ACCT: [id number]



cc:JOSÉ GO M.D.

>

## 2017-06-16 ENCOUNTER — HOSPITAL ENCOUNTER (OUTPATIENT)
Dept: HOSPITAL 62 - RAD | Age: 67
End: 2017-06-16
Attending: INTERNAL MEDICINE
Payer: MEDICARE

## 2017-06-16 DIAGNOSIS — R97.8: ICD-10-CM

## 2017-06-16 DIAGNOSIS — R18.8: ICD-10-CM

## 2017-06-16 DIAGNOSIS — K74.69: Primary | ICD-10-CM

## 2017-06-16 PROCEDURE — 74183 MRI ABD W/O CNTR FLWD CNTR: CPT

## 2017-06-16 PROCEDURE — A9576 INJ PROHANCE MULTIPACK: HCPCS

## 2017-06-16 NOTE — RADIOLOGY REPORT (SQ)
EXAM DESCRIPTION:  MRI ABDOMEN COMBO



COMPLETED DATE/TIME:  6/16/2017 11:42 am



REASON FOR STUDY:  OTHER CIRRHOSIS OF LIVER K74.69  OTHER CIRRHOSIS OF LIVER R18.8  OTHER ASCITES R97
.8  OTHER ABNORMAL TUMOR MARKERS



COMPARISON:  Ultrasound, CT.



TECHNIQUE:  Multiplanar multisequence imaging performed without and with contrast including sagittal,
 axial and coronal T2, axial T1, axial gradient fat sat T1, axial, sagittal and coronal fat sat T1 po
st contrast.



CONTRAST TYPE AND DOSE:  20 mL Prohance.



RENAL FUNCTION:  GFR > 60.



LIMITATIONS:  None.



FINDINGS:  LIVER: For no focal masses.  No dilated ducts.  No signal alteration.

SPLEEN: Splenomegaly at 14.6 cm.  No focal masses.

PANCREAS: No masses. No adjacent inflammation or peripancreatic fluid collections. Pancreatic duct no
t dilated.

GALLBLADDER: No masses. No stones. No gallbladder wall thickening or pericholecystic fluid.

ADRENAL GLANDS: No significant masses or asymmetry.

RIGHT KIDNEY AND URETER: No masses. No hydronephrosis.

LEFT KIDNEY AND URETER: Mild hydronephrosis secondary deep UPJ obstruction.

AORTA AND VESSELS: No aneurysm. No dissection. Renal arteries, SMA, celiac without stenosis.

RETROPERITONEUM: No retroperitoneal adenopathy, hemorrhage or masses.

BOWEL: No visualized masses.  No inflammation.  No significant dilatation.

ABDOMINAL WALL AND PERITONEUM: No hernias.  No free fluid.

BONES: No acute or significant findings.

OTHER: No other significant finding.



IMPRESSION:   no significant finding in the liver.  No masses and no fatty infiltration.

Mild left hydronephrosis secondary UPJ obstruction.



TECHNICAL DOCUMENTATION:  JOB ID: [id number]

 2011 [employer]- All Rights Reserved

## 2017-06-27 ENCOUNTER — HOSPITAL ENCOUNTER (OUTPATIENT)
Dept: HOSPITAL 62 - END | Age: 67
Discharge: HOME | End: 2017-06-27
Attending: INTERNAL MEDICINE
Payer: MEDICARE

## 2017-06-27 VITALS — SYSTOLIC BLOOD PRESSURE: 113 MMHG | DIASTOLIC BLOOD PRESSURE: 65 MMHG

## 2017-06-27 DIAGNOSIS — I85.00: Primary | ICD-10-CM

## 2017-06-27 DIAGNOSIS — R18.8: ICD-10-CM

## 2017-06-27 DIAGNOSIS — K74.69: ICD-10-CM

## 2017-06-27 DIAGNOSIS — B18.2: ICD-10-CM

## 2017-06-27 DIAGNOSIS — D50.0: ICD-10-CM

## 2017-06-27 DIAGNOSIS — K92.2: ICD-10-CM

## 2017-06-27 LAB
ALBUMIN SERPL-MCNC: 3.5 G/DL (ref 3.5–5)
ALP SERPL-CCNC: 82 U/L (ref 38–126)
ALT SERPL-CCNC: 77 U/L (ref 21–72)
ANION GAP SERPL CALC-SCNC: 9 MMOL/L (ref 5–19)
AST SERPL-CCNC: 116 U/L (ref 17–59)
BASOPHILS # BLD AUTO: 0 10^3/UL (ref 0–0.2)
BASOPHILS NFR BLD AUTO: 1 % (ref 0–2)
BILIRUB DIRECT SERPL-MCNC: 0.3 MG/DL (ref 0–0.4)
BILIRUB SERPL-MCNC: 0.7 MG/DL (ref 0.2–1.3)
BUN SERPL-MCNC: 11 MG/DL (ref 7–20)
CALCIUM: 9 MG/DL (ref 8.4–10.2)
CHLORIDE SERPL-SCNC: 104 MMOL/L (ref 98–107)
CO2 SERPL-SCNC: 27 MMOL/L (ref 22–30)
CREAT SERPL-MCNC: 0.7 MG/DL (ref 0.52–1.25)
EOSINOPHIL # BLD AUTO: 0.1 10^3/UL (ref 0–0.6)
EOSINOPHIL NFR BLD AUTO: 3.5 % (ref 0–6)
ERYTHROCYTE [DISTWIDTH] IN BLOOD BY AUTOMATED COUNT: 22.8 % (ref 11.5–14)
GLUCOSE SERPL-MCNC: 91 MG/DL (ref 75–110)
HCT VFR BLD CALC: 38.8 % (ref 37.9–51)
HGB BLD-MCNC: 12.3 G/DL (ref 13.5–17)
HGB HCT DIFFERENCE: -1.9
LYMPHOCYTES # BLD AUTO: 1.2 10^3/UL (ref 0.5–4.7)
LYMPHOCYTES NFR BLD AUTO: 34.4 % (ref 13–45)
MCH RBC QN AUTO: 27.1 PG (ref 27–33.4)
MCHC RBC AUTO-ENTMCNC: 31.8 G/DL (ref 32–36)
MCV RBC AUTO: 85 FL (ref 80–97)
MONOCYTES # BLD AUTO: 0.3 10^3/UL (ref 0.1–1.4)
MONOCYTES NFR BLD AUTO: 8.1 % (ref 3–13)
NEUTROPHILS # BLD AUTO: 1.9 10^3/UL (ref 1.7–8.2)
NEUTS SEG NFR BLD AUTO: 53 % (ref 42–78)
POTASSIUM SERPL-SCNC: 4.3 MMOL/L (ref 3.6–5)
PROT SERPL-MCNC: 7.1 G/DL (ref 6.3–8.2)
PROTHROMBIN TIME: 15.2 SEC (ref 11.4–15.4)
RBC # BLD AUTO: 4.56 10^6/UL (ref 4.35–5.55)
SODIUM SERPL-SCNC: 139.8 MMOL/L (ref 137–145)
WBC # BLD AUTO: 3.6 10^3/UL (ref 4–10.5)

## 2017-06-27 PROCEDURE — 0DJ08ZZ INSPECTION OF UPPER INTESTINAL TRACT, VIA NATURAL OR ARTIFICIAL OPENING ENDOSCOPIC: ICD-10-PCS | Performed by: INTERNAL MEDICINE

## 2017-06-27 PROCEDURE — 80053 COMPREHEN METABOLIC PANEL: CPT

## 2017-06-27 PROCEDURE — 85610 PROTHROMBIN TIME: CPT

## 2017-06-27 PROCEDURE — 43235 EGD DIAGNOSTIC BRUSH WASH: CPT

## 2017-06-27 PROCEDURE — 36415 COLL VENOUS BLD VENIPUNCTURE: CPT

## 2017-06-27 PROCEDURE — 85025 COMPLETE CBC W/AUTO DIFF WBC: CPT

## 2017-06-27 RX ADMIN — MIDAZOLAM HYDROCHLORIDE ONE MG: 1 INJECTION, SOLUTION INTRAMUSCULAR; INTRAVENOUS at 16:25

## 2017-06-27 RX ADMIN — MIDAZOLAM HYDROCHLORIDE ONE MG: 1 INJECTION, SOLUTION INTRAMUSCULAR; INTRAVENOUS at 16:32

## 2017-06-27 NOTE — PDOC DISCHARGE SUMMARY
Discharge Summary (SDC)





- Discharge


Final Diagnosis: 


Esophageal varices


Esophageal varices


Date of Surgery: 06/27/17


Condition: Stable


Treatment or Instructions: 


Increase propranolol to 20 mg twice daily


Discharge Diet: Other (Comments) - Low-salt diet


Discharge Activity: Activity As Tolerated


Report the Following to Your Physician Immediately: Shortness of Breath, Nausea

, Vomiting, Increase in Pain, Swelling, Warmth, Increased Soreness

## 2017-06-27 NOTE — OPERATIVE REPORT
Operative Report


DATE OF SURGERY: 06/27/17


Operative Report: 


Pre-op diagnosis: History of severe GI bleed and esophageal varices





Post-op diagnosis: Multiple very large and tortuous esophageal varices





Surgery: Esophagogastroduodenoscopy





Medications: Versed 3mg   Fentanyl  100mcg IV push





Tissue removed: None





Procedure: After informed consent obtained from patient, the throat was sprayed 

with Hurricane and conscious sedation was achieved.  The upper endoscope was 

inserted into the esophagus under direct vision and advanced into the stomach.  

The duodenum was entered and examined to the second part.  Endoscope was then 

slowly pulled out of the patient as the mucosa was examined into details.  

Patient tolerated procedure well.





Findings





Esophagus: He has multiple columns of very large, tortous varices involving the 

last 15 cm of the esophagus.  I did not feel I could safely grasp any of the 

varix column within a rubber band due to the size.





Antrum: Normal


Body: Normal


Fundus: Normal


Duodenum first part: Normal


Duodenum second part: Normal





Plan:


We will increase his propranolol and continue medication prophylaxis for his 

portal hypertension.





OPERATION: .

## 2017-08-03 ENCOUNTER — HOSPITAL ENCOUNTER (OUTPATIENT)
Dept: HOSPITAL 62 - RAD | Age: 67
End: 2017-08-03
Attending: UROLOGY
Payer: MEDICARE

## 2017-08-03 DIAGNOSIS — N13.30: Primary | ICD-10-CM

## 2017-08-03 PROCEDURE — A9562 TC99M MERTIATIDE: HCPCS

## 2017-08-03 PROCEDURE — 78708 K FLOW/FUNCT IMAGE W/DRUG: CPT

## 2017-08-03 NOTE — RADIOLOGY REPORT (SQ)
EXAM DESCRIPTION:  NM RENAL WITH LASIX



COMPLETED DATE/TIME:  8/3/2017 12:24 pm



REASON FOR STUDY:  HYDRONEPHROSIS N13.30  UNSPECIFIED HYDRONEPHROSIS



COMPARISON:  None.



RADIONUCLIDE AND DOSE:  5.43 millicuries Tc-99m MAG 3

The route of agent administration: Intravenous



ADDITIONAL DRUGS AND DOSES:  Lasix 20 mg.



TECHNIQUE:  Following administration of the radionuclide, flow images of the kidneys were acquired fo
llowed by sequential imaging for 30 minutes. Intravenous Lasix was given at the midpoint of the study
. Time activity curves were generated.



LIMITATIONS:  None.



FINDINGS:  ACTIVITY LEFT KIDNEY: 58 %.

ACTIVITY RIGHT KIDNEY: 42  %.

There is prompt uptake of activity in the kidneys bilaterally simultaneous with passage of the aortic
 bolus.

On the left side there is evidence of obstructive uropathy.  There is persistent accumulation of radi
opharmaceutical in the dilated pelvicaliceal system with no progression into the ureter.  Time activi
ty curves demonstrate persistent retention of activity in the collecting system with no response to L
asix.

There is normal excretion of the right kidney with progression of activity from the renal cortex into
 the collecting system and subsequently into the ureter.  Time activity curves demonstrate normal exc
retory pattern of the right kidney with no abnormal retention.  No obstructive changes.



IMPRESSION:  OBSTRUCTIVE UROPATHY ON THE LEFT SIDE SECONDARY TO URETEROPELVIC JUNCTION OBSTRUCTION.  
NORMAL LASIX RENOGRAM ON THE RIGHT SIDE.



TECHNICAL DOCUMENTATION:  JOB ID:  [id number]

 2011 [employer]- All Rights Reserved

## 2019-09-12 NOTE — PDOC PROGRESS REPORT
Subjective


Progress Note for:: 06/01/17


Subjective:: 


Feels well.  No complaints.  Tolerated bowel prep well.  No blood per rectum.





Physical Exam


Vital Signs: 


 











Temp Pulse Resp BP Pulse Ox


 


 99.4 F   73   20   99/57 L  95 


 


 06/01/17 07:32  06/01/17 11:25  06/01/17 11:25  06/01/17 11:25  06/01/17 11:25








 Intake & Output











 05/31/17 06/01/17 06/02/17





 06:59 06:59 06:59


 


Intake Total 900 900 550


 


Balance 900 900 550


 


Weight 87.8 kg  











General appearance: PRESENT: no acute distress, cooperative


Respiratory exam: PRESENT: clear to auscultation lauren


Cardiovascular exam: PRESENT: RRR


GI/Abdominal exam: PRESENT: other - Soft, distended but nontender, easily 

reducible ventral hernias.





Results


Laboratory Results: 


 





 06/01/17 08:23 





 05/30/17 16:05 





 











  05/31/17 05/31/17 06/01/17





  09:27 15:37 08:23


 


WBC    2.9 L


 


RBC    3.03 L


 


Hgb    8.0 L


 


Hct    24.2 L


 


MCV    80


 


MCH    26.4 L


 


MCHC    33.1


 


RDW    16.6 H


 


Plt Count    95 L


 


Seg Neutrophils %    54.6


 


Lymphocytes %    31.5


 


Monocytes %    9.6


 


Eosinophils %    3.4


 


Basophils %    0.9


 


Absolute Neutrophils    1.6 L


 


Absolute Lymphocytes    0.9


 


Absolute Monocytes    0.3


 


Absolute Eosinophils    0.1


 


Absolute Basophils    0.0


 


Iron  11.5 L  


 


TIBC  441  


 


% Saturation  3  


 


Ferritin  14.30 L  


 


Fluid Type   PERITONEAL 


 


Fluid Source   PARACENTESIS 


 


Fluid Color   STRAW 


 


Fluid Appearance   CLEAR 


 


Fluid Viscosity   LIQUID 


 


Fluid WBC   97 


 


Fluid RBC   62 











Impressions: 


 





Abdomen/Pelvis CT  05/30/17 00:00


IMPRESSION:  Intra-abdominal and pelvic ascitic fluid as noted above.  Small 

umbilical hernia is identified containing a small amount of bowel without 

obstruction and ascitic fluid.  Mild hydronephrosis of the left kidney.  Other 

findings as noted above


 








Paracentesis Ultrasound  05/31/17 08:33


IMPRESSION:  Successful ultrasound-guided diagnostic and therapeutic 

paracentesis


 














Assessment & Plan





- Diagnosis


(1) Pancytopenia


Is this a current diagnosis for this admission?: YesPlan: 








Patient doing well with no evidence of recurrent gastrointestinal bleed.  

Underwent EGD and colonoscopy.  Colonoscopy demonstrated hemorrhoids and 

markedly redundant colon but no evidence of colon cancer.  EGD demonstrated 

very prominent esophageal varices but no active bleeding.  Patient had the 

incidental fundic gland polyps 1 of them which was snare polypectomied.  No 

evidence of peptic ulcer disease was found. Will arrange outpt consultation 

with gastroenterology for management of the apparent cirrhosis with portal 

hypertension.  Most likely source of his anemia is esophageal variceal bleed 1-

2 weeks ago. No GI consult available today. Will start propranolol today, make 

sure he does not have an presyncope on propanolol prior to discharge. Spine appears normal, range of motion is not limited, no muscle or joint tenderness
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
